# Patient Record
Sex: FEMALE | Race: WHITE | NOT HISPANIC OR LATINO | URBAN - METROPOLITAN AREA
[De-identification: names, ages, dates, MRNs, and addresses within clinical notes are randomized per-mention and may not be internally consistent; named-entity substitution may affect disease eponyms.]

---

## 2017-07-27 ENCOUNTER — OFFICE VISIT (OUTPATIENT)
Dept: SURGERY | Facility: CLINIC | Age: 47
End: 2017-07-27
Payer: COMMERCIAL

## 2017-07-27 VITALS
DIASTOLIC BLOOD PRESSURE: 70 MMHG | RESPIRATION RATE: 16 BRPM | HEART RATE: 74 BPM | BODY MASS INDEX: 23.21 KG/M2 | WEIGHT: 126.13 LBS | SYSTOLIC BLOOD PRESSURE: 102 MMHG | HEIGHT: 62 IN

## 2017-07-27 DIAGNOSIS — K64.4 EXTERNAL HEMORRHOIDS: Primary | ICD-10-CM

## 2017-07-27 PROCEDURE — 99999 PR PBB SHADOW E&M-NEW PATIENT-LVL III: CPT | Mod: PBBFAC,,, | Performed by: COLON & RECTAL SURGERY

## 2017-07-27 PROCEDURE — 99203 OFFICE O/P NEW LOW 30 MIN: CPT | Mod: S$GLB,,, | Performed by: COLON & RECTAL SURGERY

## 2017-07-27 RX ORDER — SPIRONOLACTONE 25 MG/1
75 TABLET ORAL DAILY
COMMUNITY

## 2017-07-27 RX ORDER — UBIDECARENONE 30 MG
30 CAPSULE ORAL 3 TIMES DAILY
COMMUNITY

## 2017-07-27 RX ORDER — DESOGESTREL AND ETHINYL ESTRADIOL 21-5 (28)
1 KIT ORAL DAILY
COMMUNITY

## 2017-07-27 RX ORDER — HYDROCORTISONE ACETATE PRAMOXINE HCL 2.5; 1 G/100G; G/100G
CREAM TOPICAL 2 TIMES DAILY
Qty: 30 G | Refills: 5 | Status: SHIPPED | OUTPATIENT
Start: 2017-07-27 | End: 2017-08-06

## 2017-07-27 RX ORDER — DESVENLAFAXINE SUCCINATE 50 MG/1
50 TABLET, EXTENDED RELEASE ORAL DAILY
COMMUNITY

## 2017-07-27 NOTE — PROGRESS NOTES
Subjective:       Patient ID: Siomara Bruno is a 47 y.o. female.    Chief Complaint: Hemorrhoids; Rectal Bleeding; and Rectal Pain    HPI  48 yo F - minor problems with hemorrhoids since childbirth 22 yrs ago - much worse since  - main issue is discomfort & trouble getting clean after BM's - some bleeding on toilet paper when she wipes but not much - no relief with Prep H, proctozone, Tucks.  She has occasional constipation - tries to increase fiber/fluid intake when constipation acts up.  She has a hx of IBS with alternating diarrhea/constipation - this has not been that problematic over past few years.    Last colonoscopy >10 years during work-up for IBS - negative per patient  + family hx of CRC - PGM - dx'd age 90.  No family hx of IBD.      Review of patient's allergies indicates:  No Known Allergies    Past Medical History:   Diagnosis Date    Depression     IBS (irritable bowel syndrome)        Past Surgical History:   Procedure Laterality Date     SECTION      DILATION AND CURETTAGE OF UTERUS      x2       Current Outpatient Prescriptions   Medication Sig Dispense Refill    CHLORDIAZEPOXIDE/CLIDINIUM BR (CHLORDIAZEPOXIDE-CLIDINIUM ORAL) Take by mouth as needed.      co-enzyme Q-10 30 mg capsule Take 30 mg by mouth 3 (three) times daily.      desog-e.estradiol/e.estradiol (KARIVA) 0.15-0.02 mgx21 /0.01 mg x 5 per tablet Take 1 tablet by mouth once daily.      desvenlafaxine succinate (PRISTIQ) 50 MG Tb24 Take 50 mg by mouth once daily.      GRAPE SEED EXTRACT (GRAPE SEED ORAL) Take by mouth once daily.      MULTIVIT WITH CALCIUM,IRON,MIN (WOMEN'S MULTIPLE VITAMINS ORAL) Take by mouth once daily.      spironolactone (ALDACTONE) 25 MG tablet Take 75 mg by mouth once daily.       No current facility-administered medications for this visit.        Family History   Problem Relation Age of Onset    Hypertension Mother        Social History     Social History    Marital status:       Spouse name: N/A    Number of children: N/A    Years of education: N/A     Social History Main Topics    Smoking status: Never Smoker    Smokeless tobacco: Never Used    Alcohol use No    Drug use: No    Sexual activity: Yes     Partners: Male      Comment:      Other Topics Concern    None     Social History Narrative    None       Review of Systems   Constitutional: Negative for chills and fever.   HENT: Negative for congestion and sore throat.    Eyes: Negative for visual disturbance.   Respiratory: Negative for cough and shortness of breath.    Cardiovascular: Negative for chest pain and palpitations.   Gastrointestinal: Positive for anal bleeding, constipation (intermittent) and rectal pain. Negative for abdominal distention, abdominal pain, blood in stool, diarrhea, nausea and vomiting.   Endocrine: Negative for cold intolerance and heat intolerance.   Genitourinary: Negative for dysuria and frequency.   Musculoskeletal: Negative for arthralgias, back pain and neck pain.   Skin: Negative for rash.   Allergic/Immunologic: Negative for immunocompromised state.   Neurological: Negative for dizziness, light-headedness and headaches.   Hematological: Does not bruise/bleed easily.   Psychiatric/Behavioral: Negative for confusion. The patient is not nervous/anxious.        Objective:      Physical Exam   Constitutional: She is oriented to person, place, and time. She appears well-developed and well-nourished.   HENT:   Head: Normocephalic.   Pulmonary/Chest: Effort normal. No respiratory distress.   Abdominal: Soft. Bowel sounds are normal. She exhibits no distension and no mass. There is no tenderness. There is no rebound and no guarding.   Genitourinary:   Genitourinary Comments: Perineum - normal perianal skin, no mass, no fissure, +edematous/inflamed (non-thrombosed) 3-quadrant external hemorrhoids  OMAIRA - good tone, no mass, very tender (from external hemorrhoid)  Anoscopy - deferred    Musculoskeletal: Normal range of motion.   Neurological: She is alert and oriented to person, place, and time.   Skin: Skin is warm and dry.   Psychiatric: She has a normal mood and affect.             Assessment:       1. External hemorrhoids        Plan:   Discussed conservative measures vs surgery.  She is a teacher & starts school next week, so she can not undergo surgery right now & wants to try dedicated conservative measures.    -Soaks/sitz baths   -Increased fiber/fluid intake and daily fiber supplement  -Topical Analpram 2.5% 2-3x/day externally   -Avoid trauma - moistened wipes, avoid straining, avoid spending prolonged periods on sitting on toilet  -Avoid excessive moisture    -RTO if symptoms worsen or if she decides that she wants to proceed with surgery.    Fidel Tatum MD, FACS, FASCRS